# Patient Record
Sex: FEMALE | Race: WHITE | NOT HISPANIC OR LATINO
[De-identification: names, ages, dates, MRNs, and addresses within clinical notes are randomized per-mention and may not be internally consistent; named-entity substitution may affect disease eponyms.]

---

## 2017-01-23 ENCOUNTER — APPOINTMENT (OUTPATIENT)
Dept: INTERNAL MEDICINE | Facility: CLINIC | Age: 42
End: 2017-01-23

## 2017-01-23 VITALS
WEIGHT: 235 LBS | OXYGEN SATURATION: 98 % | DIASTOLIC BLOOD PRESSURE: 76 MMHG | HEART RATE: 123 BPM | SYSTOLIC BLOOD PRESSURE: 126 MMHG | BODY MASS INDEX: 37.93 KG/M2 | TEMPERATURE: 98.2 F

## 2017-02-21 ENCOUNTER — RX RENEWAL (OUTPATIENT)
Age: 42
End: 2017-02-21

## 2017-02-22 ENCOUNTER — APPOINTMENT (OUTPATIENT)
Dept: INTERNAL MEDICINE | Facility: CLINIC | Age: 42
End: 2017-02-22

## 2017-03-22 ENCOUNTER — APPOINTMENT (OUTPATIENT)
Dept: INTERNAL MEDICINE | Facility: CLINIC | Age: 42
End: 2017-03-22

## 2017-03-30 ENCOUNTER — RX RENEWAL (OUTPATIENT)
Age: 42
End: 2017-03-30

## 2017-03-31 ENCOUNTER — APPOINTMENT (OUTPATIENT)
Dept: INTERNAL MEDICINE | Facility: CLINIC | Age: 42
End: 2017-03-31

## 2017-03-31 ENCOUNTER — NON-APPOINTMENT (OUTPATIENT)
Age: 42
End: 2017-03-31

## 2017-03-31 VITALS
HEART RATE: 110 BPM | BODY MASS INDEX: 38.25 KG/M2 | TEMPERATURE: 97.9 F | DIASTOLIC BLOOD PRESSURE: 88 MMHG | OXYGEN SATURATION: 92 % | SYSTOLIC BLOOD PRESSURE: 124 MMHG | WEIGHT: 238 LBS | HEIGHT: 66 IN

## 2017-03-31 DIAGNOSIS — R07.89 OTHER CHEST PAIN: ICD-10-CM

## 2017-04-03 ENCOUNTER — APPOINTMENT (OUTPATIENT)
Dept: INTERNAL MEDICINE | Facility: CLINIC | Age: 42
End: 2017-04-03

## 2017-04-03 DIAGNOSIS — Z11.1 ENCOUNTER FOR SCREENING FOR RESPIRATORY TUBERCULOSIS: ICD-10-CM

## 2017-04-04 LAB
25(OH)D3 SERPL-MCNC: 10.7 NG/ML
ALBUMIN SERPL ELPH-MCNC: 4.2 G/DL
ALP BLD-CCNC: 132 U/L
ALT SERPL-CCNC: 53 U/L
ANION GAP SERPL CALC-SCNC: 19 MMOL/L
APPEARANCE: CLEAR
AST SERPL-CCNC: 40 U/L
BASOPHILS # BLD AUTO: 0.01 K/UL
BASOPHILS NFR BLD AUTO: 0.1 %
BILIRUB SERPL-MCNC: 0.2 MG/DL
BILIRUBIN URINE: NEGATIVE
BLOOD URINE: NEGATIVE
BUN SERPL-MCNC: 6 MG/DL
CALCIUM SERPL-MCNC: 9.4 MG/DL
CHLORIDE SERPL-SCNC: 101 MMOL/L
CHOLEST SERPL-MCNC: 192 MG/DL
CHOLEST/HDLC SERPL: 11.3 RATIO
CO2 SERPL-SCNC: 22 MMOL/L
COLOR: YELLOW
CREAT SERPL-MCNC: 0.98 MG/DL
EOSINOPHIL # BLD AUTO: 0.24 K/UL
EOSINOPHIL NFR BLD AUTO: 2.7 %
GLUCOSE QUALITATIVE U: NORMAL MG/DL
GLUCOSE SERPL-MCNC: 112 MG/DL
HAV IGG+IGM SER QL: REACTIVE
HBA1C MFR BLD HPLC: 5.7 %
HBV SURFACE AB SER QL: NONREACTIVE
HBV SURFACE AG SER QL: NONREACTIVE
HCT VFR BLD CALC: 42.9 %
HDLC SERPL-MCNC: 17 MG/DL
HGB BLD-MCNC: 13.5 G/DL
HIV1+2 AB SPEC QL IA.RAPID: NONREACTIVE
HSV 1+2 IGG SER IA-IMP: NEGATIVE
HSV 1+2 IGG SER IA-IMP: POSITIVE
HSV1 IGG SER QL: 0.2 INDEX
HSV2 IGG SER QL: 4.69 INDEX
IMM GRANULOCYTES NFR BLD AUTO: 0.5 %
KETONES URINE: NEGATIVE
LDLC SERPL CALC-MCNC: NORMAL MG/DL
LEUKOCYTE ESTERASE URINE: NEGATIVE
LYMPHOCYTES # BLD AUTO: 2.63 K/UL
LYMPHOCYTES NFR BLD AUTO: 29.9 %
MAN DIFF?: NORMAL
MCHC RBC-ENTMCNC: 27.6 PG
MCHC RBC-ENTMCNC: 31.5 GM/DL
MCV RBC AUTO: 87.6 FL
MONOCYTES # BLD AUTO: 0.57 K/UL
MONOCYTES NFR BLD AUTO: 6.5 %
NEUTROPHILS # BLD AUTO: 5.3 K/UL
NEUTROPHILS NFR BLD AUTO: 60.3 %
NITRITE URINE: NEGATIVE
PH URINE: 5.5
PLATELET # BLD AUTO: 305 K/UL
POTASSIUM SERPL-SCNC: 4 MMOL/L
PROT SERPL-MCNC: 7.2 G/DL
PROTEIN URINE: NEGATIVE MG/DL
RBC # BLD: 4.9 M/UL
RBC # FLD: 15.2 %
SODIUM SERPL-SCNC: 142 MMOL/L
SPECIFIC GRAVITY URINE: 1.01
T PALLIDUM AB SER QL IA: NEGATIVE
TRIGL SERPL-MCNC: 680 MG/DL
TSH SERPL-ACNC: 3.15 UIU/ML
UROBILINOGEN URINE: NORMAL MG/DL
WBC # FLD AUTO: 8.79 K/UL

## 2017-04-17 ENCOUNTER — RX RENEWAL (OUTPATIENT)
Age: 42
End: 2017-04-17

## 2017-05-08 ENCOUNTER — RX RENEWAL (OUTPATIENT)
Age: 42
End: 2017-05-08

## 2017-06-12 ENCOUNTER — APPOINTMENT (OUTPATIENT)
Dept: INTERNAL MEDICINE | Facility: CLINIC | Age: 42
End: 2017-06-12

## 2017-10-12 ENCOUNTER — RX RENEWAL (OUTPATIENT)
Age: 42
End: 2017-10-12

## 2017-11-01 ENCOUNTER — APPOINTMENT (OUTPATIENT)
Dept: INTERNAL MEDICINE | Facility: CLINIC | Age: 42
End: 2017-11-01

## 2018-01-03 ENCOUNTER — RX RENEWAL (OUTPATIENT)
Age: 43
End: 2018-01-03

## 2018-01-11 ENCOUNTER — EMERGENCY (EMERGENCY)
Facility: HOSPITAL | Age: 43
LOS: 0 days | Discharge: HOME | End: 2018-01-11

## 2018-01-11 DIAGNOSIS — Z87.891 PERSONAL HISTORY OF NICOTINE DEPENDENCE: ICD-10-CM

## 2018-01-11 DIAGNOSIS — R51 HEADACHE: ICD-10-CM

## 2018-01-11 DIAGNOSIS — R00.1 BRADYCARDIA, UNSPECIFIED: ICD-10-CM

## 2018-01-11 DIAGNOSIS — Z87.898 PERSONAL HISTORY OF OTHER SPECIFIED CONDITIONS: ICD-10-CM

## 2018-01-11 DIAGNOSIS — R11.2 NAUSEA WITH VOMITING, UNSPECIFIED: ICD-10-CM

## 2018-01-11 DIAGNOSIS — Z79.899 OTHER LONG TERM (CURRENT) DRUG THERAPY: ICD-10-CM

## 2018-01-11 DIAGNOSIS — Z88.8 ALLERGY STATUS TO OTHER DRUGS, MEDICAMENTS AND BIOLOGICAL SUBSTANCES STATUS: ICD-10-CM

## 2018-01-11 DIAGNOSIS — F41.9 ANXIETY DISORDER, UNSPECIFIED: ICD-10-CM

## 2018-01-11 DIAGNOSIS — F41.8 OTHER SPECIFIED ANXIETY DISORDERS: ICD-10-CM

## 2018-02-07 ENCOUNTER — APPOINTMENT (OUTPATIENT)
Dept: INTERNAL MEDICINE | Facility: CLINIC | Age: 43
End: 2018-02-07
Payer: MEDICAID

## 2018-02-07 VITALS
WEIGHT: 201 LBS | DIASTOLIC BLOOD PRESSURE: 86 MMHG | OXYGEN SATURATION: 97 % | BODY MASS INDEX: 32.3 KG/M2 | TEMPERATURE: 98 F | SYSTOLIC BLOOD PRESSURE: 119 MMHG | HEIGHT: 66 IN | HEART RATE: 100 BPM

## 2018-02-07 PROCEDURE — 99214 OFFICE O/P EST MOD 30 MIN: CPT

## 2018-02-09 ENCOUNTER — RX RENEWAL (OUTPATIENT)
Age: 43
End: 2018-02-09

## 2018-02-20 ENCOUNTER — APPOINTMENT (OUTPATIENT)
Dept: NEUROLOGY | Facility: CLINIC | Age: 43
End: 2018-02-20

## 2018-04-03 ENCOUNTER — APPOINTMENT (OUTPATIENT)
Dept: NEUROLOGY | Facility: CLINIC | Age: 43
End: 2018-04-03
Payer: MEDICAID

## 2018-04-03 VITALS
BODY MASS INDEX: 32.3 KG/M2 | HEIGHT: 66 IN | OXYGEN SATURATION: 94 % | HEART RATE: 88 BPM | DIASTOLIC BLOOD PRESSURE: 115 MMHG | WEIGHT: 201 LBS | SYSTOLIC BLOOD PRESSURE: 153 MMHG

## 2018-04-03 VITALS
OXYGEN SATURATION: 98 % | BODY MASS INDEX: 32.3 KG/M2 | DIASTOLIC BLOOD PRESSURE: 85 MMHG | HEART RATE: 91 BPM | HEIGHT: 66 IN | SYSTOLIC BLOOD PRESSURE: 121 MMHG | WEIGHT: 201 LBS

## 2018-04-03 PROCEDURE — 99205 OFFICE O/P NEW HI 60 MIN: CPT

## 2018-04-03 RX ORDER — CLONAZEPAM 1 MG/1
1 TABLET ORAL
Refills: 0 | Status: ACTIVE | COMMUNITY

## 2018-04-04 ENCOUNTER — APPOINTMENT (OUTPATIENT)
Dept: INTERNAL MEDICINE | Facility: CLINIC | Age: 43
End: 2018-04-04
Payer: MEDICAID

## 2018-04-04 VITALS
SYSTOLIC BLOOD PRESSURE: 140 MMHG | OXYGEN SATURATION: 98 % | TEMPERATURE: 97.8 F | BODY MASS INDEX: 31.82 KG/M2 | WEIGHT: 198 LBS | DIASTOLIC BLOOD PRESSURE: 88 MMHG | HEART RATE: 90 BPM | HEIGHT: 66 IN

## 2018-04-04 DIAGNOSIS — B19.20 UNSPECIFIED VIRAL HEPATITIS C W/OUT HEPATIC COMA: ICD-10-CM

## 2018-04-04 DIAGNOSIS — R94.31 ABNORMAL ELECTROCARDIOGRAM [ECG] [EKG]: ICD-10-CM

## 2018-04-04 DIAGNOSIS — R51 HEADACHE: ICD-10-CM

## 2018-04-04 DIAGNOSIS — F32.9 MAJOR DEPRESSIVE DISORDER, SINGLE EPISODE, UNSPECIFIED: ICD-10-CM

## 2018-04-04 PROCEDURE — G0447 BEHAVIOR COUNSEL OBESITY 15M: CPT

## 2018-04-04 PROCEDURE — G0444 DEPRESSION SCREEN ANNUAL: CPT | Mod: 59

## 2018-04-04 PROCEDURE — 99396 PREV VISIT EST AGE 40-64: CPT | Mod: 25

## 2018-04-04 PROCEDURE — 36415 COLL VENOUS BLD VENIPUNCTURE: CPT

## 2018-04-04 PROCEDURE — 93000 ELECTROCARDIOGRAM COMPLETE: CPT

## 2018-04-05 LAB
ALBUMIN SERPL ELPH-MCNC: 4.6 G/DL
ALP BLD-CCNC: 102 U/L
ALT SERPL-CCNC: 27 U/L
ANION GAP SERPL CALC-SCNC: 17 MMOL/L
APPEARANCE: CLEAR
AST SERPL-CCNC: 19 U/L
BASOPHILS # BLD AUTO: 0.02 K/UL
BASOPHILS NFR BLD AUTO: 0.3 %
BILIRUB SERPL-MCNC: 0.5 MG/DL
BILIRUBIN URINE: NEGATIVE
BLOOD URINE: NEGATIVE
BUN SERPL-MCNC: 8 MG/DL
CALCIUM SERPL-MCNC: 9.4 MG/DL
CHLORIDE SERPL-SCNC: 101 MMOL/L
CHOLEST SERPL-MCNC: 196 MG/DL
CHOLEST/HDLC SERPL: 4.7 RATIO
CO2 SERPL-SCNC: 23 MMOL/L
COLOR: YELLOW
CREAT SERPL-MCNC: 0.9 MG/DL
CRP SERPL-MCNC: 0.4 MG/DL
EOSINOPHIL # BLD AUTO: 0.12 K/UL
EOSINOPHIL NFR BLD AUTO: 1.7 %
ERYTHROCYTE [SEDIMENTATION RATE] IN BLOOD BY WESTERGREN METHOD: 16 MM/HR
GLUCOSE QUALITATIVE U: NEGATIVE MG/DL
GLUCOSE SERPL-MCNC: 86 MG/DL
HBA1C MFR BLD HPLC: 4.9 %
HBV SURFACE AB SER QL: NONREACTIVE
HBV SURFACE AG SER QL: NONREACTIVE
HCT VFR BLD CALC: 46 %
HDLC SERPL-MCNC: 42 MG/DL
HGB BLD-MCNC: 15.3 G/DL
IMM GRANULOCYTES NFR BLD AUTO: 0.1 %
KETONES URINE: NEGATIVE
LDLC SERPL CALC-MCNC: 112 MG/DL
LEUKOCYTE ESTERASE URINE: NEGATIVE
LYMPHOCYTES # BLD AUTO: 2.15 K/UL
LYMPHOCYTES NFR BLD AUTO: 30.2 %
MAN DIFF?: NORMAL
MCHC RBC-ENTMCNC: 29 PG
MCHC RBC-ENTMCNC: 33.3 GM/DL
MCV RBC AUTO: 87.1 FL
MONOCYTES # BLD AUTO: 0.34 K/UL
MONOCYTES NFR BLD AUTO: 4.8 %
NEUTROPHILS # BLD AUTO: 4.48 K/UL
NEUTROPHILS NFR BLD AUTO: 62.9 %
NITRITE URINE: NEGATIVE
PH URINE: 5
PLATELET # BLD AUTO: 282 K/UL
POTASSIUM SERPL-SCNC: 3.9 MMOL/L
PROT SERPL-MCNC: 7.3 G/DL
PROTEIN URINE: NEGATIVE MG/DL
RBC # BLD: 5.28 M/UL
RBC # FLD: 13.7 %
SODIUM SERPL-SCNC: 141 MMOL/L
SPECIFIC GRAVITY URINE: 1.01
TRIGL SERPL-MCNC: 209 MG/DL
TSH SERPL-ACNC: 1.48 UIU/ML
UROBILINOGEN URINE: NEGATIVE MG/DL
WBC # FLD AUTO: 7.12 K/UL

## 2018-04-06 LAB
HCV RNA SERPL NAA DL=5-ACNC: NOT DETECTED IU/ML
HCV RNA SERPL NAA+PROBE-LOG IU: NOT DETECTED LOGIU/ML

## 2018-04-13 ENCOUNTER — RX RENEWAL (OUTPATIENT)
Age: 43
End: 2018-04-13

## 2018-05-11 ENCOUNTER — APPOINTMENT (OUTPATIENT)
Dept: MRI IMAGING | Facility: HOSPITAL | Age: 43
End: 2018-05-11
Payer: MEDICAID

## 2018-05-14 ENCOUNTER — RX RENEWAL (OUTPATIENT)
Age: 43
End: 2018-05-14

## 2018-05-15 ENCOUNTER — APPOINTMENT (OUTPATIENT)
Dept: NEUROLOGY | Facility: CLINIC | Age: 43
End: 2018-05-15

## 2018-06-08 ENCOUNTER — APPOINTMENT (OUTPATIENT)
Dept: MRI IMAGING | Facility: HOSPITAL | Age: 43
End: 2018-06-08

## 2018-06-25 ENCOUNTER — FORM ENCOUNTER (OUTPATIENT)
Age: 43
End: 2018-06-25

## 2018-06-26 ENCOUNTER — APPOINTMENT (OUTPATIENT)
Dept: MRI IMAGING | Facility: HOSPITAL | Age: 43
End: 2018-06-26

## 2018-06-26 ENCOUNTER — OUTPATIENT (OUTPATIENT)
Dept: OUTPATIENT SERVICES | Facility: HOSPITAL | Age: 43
LOS: 1 days | End: 2018-06-26
Payer: COMMERCIAL

## 2018-06-26 PROCEDURE — 70551 MRI BRAIN STEM W/O DYE: CPT

## 2018-06-26 PROCEDURE — 70551 MRI BRAIN STEM W/O DYE: CPT | Mod: 26

## 2018-07-05 ENCOUNTER — RX RENEWAL (OUTPATIENT)
Age: 43
End: 2018-07-05

## 2018-07-10 ENCOUNTER — OTHER (OUTPATIENT)
Age: 43
End: 2018-07-10

## 2018-07-11 ENCOUNTER — OTHER (OUTPATIENT)
Age: 43
End: 2018-07-11

## 2018-07-17 ENCOUNTER — APPOINTMENT (OUTPATIENT)
Dept: MRI IMAGING | Facility: CLINIC | Age: 43
End: 2018-07-17

## 2018-07-27 ENCOUNTER — APPOINTMENT (OUTPATIENT)
Dept: MRI IMAGING | Facility: CLINIC | Age: 43
End: 2018-07-27

## 2018-08-01 ENCOUNTER — RX RENEWAL (OUTPATIENT)
Age: 43
End: 2018-08-01

## 2018-09-04 ENCOUNTER — RX RENEWAL (OUTPATIENT)
Age: 43
End: 2018-09-04

## 2018-10-04 ENCOUNTER — RX RENEWAL (OUTPATIENT)
Age: 43
End: 2018-10-04

## 2018-10-24 ENCOUNTER — RX RENEWAL (OUTPATIENT)
Age: 43
End: 2018-10-24

## 2019-01-24 ENCOUNTER — APPOINTMENT (OUTPATIENT)
Dept: INTERNAL MEDICINE | Facility: CLINIC | Age: 44
End: 2019-01-24
Payer: MEDICARE

## 2019-01-24 VITALS
DIASTOLIC BLOOD PRESSURE: 90 MMHG | BODY MASS INDEX: 38.09 KG/M2 | OXYGEN SATURATION: 97 % | WEIGHT: 237 LBS | HEIGHT: 66 IN | SYSTOLIC BLOOD PRESSURE: 140 MMHG | TEMPERATURE: 98.4 F | HEART RATE: 91 BPM

## 2019-01-24 DIAGNOSIS — M54.31 SCIATICA, RIGHT SIDE: ICD-10-CM

## 2019-01-24 DIAGNOSIS — Z59.9 PROBLEM RELATED TO HOUSING AND ECONOMIC CIRCUMSTANCES, UNSPECIFIED: ICD-10-CM

## 2019-01-24 PROCEDURE — 99213 OFFICE O/P EST LOW 20 MIN: CPT

## 2019-01-24 RX ORDER — AMITRIPTYLINE HYDROCHLORIDE 100 MG/1
100 TABLET, FILM COATED ORAL
Qty: 30 | Refills: 0 | Status: ACTIVE | COMMUNITY
Start: 2019-01-10

## 2019-01-24 SDOH — ECONOMIC STABILITY - INCOME SECURITY: PROBLEM RELATED TO HOUSING AND ECONOMIC CIRCUMSTANCES, UNSPECIFIED: Z59.9

## 2019-03-25 ENCOUNTER — RX RENEWAL (OUTPATIENT)
Age: 44
End: 2019-03-25

## 2019-03-28 ENCOUNTER — TRANSCRIPTION ENCOUNTER (OUTPATIENT)
Age: 44
End: 2019-03-28

## 2019-04-10 ENCOUNTER — APPOINTMENT (OUTPATIENT)
Dept: PHYSICAL MEDICINE AND REHAB | Facility: CLINIC | Age: 44
End: 2019-04-10

## 2019-04-15 ENCOUNTER — APPOINTMENT (OUTPATIENT)
Dept: PHYSICAL MEDICINE AND REHAB | Facility: CLINIC | Age: 44
End: 2019-04-15

## 2019-04-17 ENCOUNTER — RX RENEWAL (OUTPATIENT)
Age: 44
End: 2019-04-17

## 2019-05-09 ENCOUNTER — RX RENEWAL (OUTPATIENT)
Age: 44
End: 2019-05-09

## 2019-05-16 ENCOUNTER — APPOINTMENT (OUTPATIENT)
Dept: PHYSICAL MEDICINE AND REHAB | Facility: CLINIC | Age: 44
End: 2019-05-16
Payer: MEDICARE

## 2019-05-16 VITALS — HEART RATE: 82 BPM | BODY MASS INDEX: 38.57 KG/M2 | WEIGHT: 240 LBS | HEIGHT: 66 IN

## 2019-05-16 PROCEDURE — 99203 OFFICE O/P NEW LOW 30 MIN: CPT | Mod: 25

## 2019-05-16 PROCEDURE — 20610 DRAIN/INJ JOINT/BURSA W/O US: CPT | Mod: RT

## 2019-06-11 ENCOUNTER — RX RENEWAL (OUTPATIENT)
Age: 44
End: 2019-06-11

## 2019-06-12 ENCOUNTER — RX RENEWAL (OUTPATIENT)
Age: 44
End: 2019-06-12

## 2019-06-24 ENCOUNTER — APPOINTMENT (OUTPATIENT)
Dept: INTERNAL MEDICINE | Facility: CLINIC | Age: 44
End: 2019-06-24
Payer: MEDICARE

## 2019-06-24 DIAGNOSIS — F41.9 ANXIETY DISORDER, UNSPECIFIED: ICD-10-CM

## 2019-06-24 DIAGNOSIS — E66.9 OBESITY, UNSPECIFIED: ICD-10-CM

## 2019-06-24 DIAGNOSIS — M17.12 UNILATERAL PRIMARY OSTEOARTHRITIS, LEFT KNEE: ICD-10-CM

## 2019-06-24 DIAGNOSIS — M17.11 UNILATERAL PRIMARY OSTEOARTHRITIS, RIGHT KNEE: ICD-10-CM

## 2019-06-24 DIAGNOSIS — Z86.59 PERSONAL HISTORY OF OTHER MENTAL AND BEHAVIORAL DISORDERS: ICD-10-CM

## 2019-06-24 PROCEDURE — 99214 OFFICE O/P EST MOD 30 MIN: CPT

## 2019-06-24 RX ORDER — GABAPENTIN 300 MG/1
300 CAPSULE ORAL
Qty: 90 | Refills: 0 | Status: DISCONTINUED | COMMUNITY
Start: 2018-12-13 | End: 2019-06-24

## 2019-07-08 ENCOUNTER — RX RENEWAL (OUTPATIENT)
Age: 44
End: 2019-07-08

## 2019-07-30 ENCOUNTER — RX RENEWAL (OUTPATIENT)
Age: 44
End: 2019-07-30

## 2019-07-30 ENCOUNTER — APPOINTMENT (OUTPATIENT)
Dept: INTERNAL MEDICINE | Facility: CLINIC | Age: 44
End: 2019-07-30

## 2019-07-30 RX ORDER — BETAMETHASONE DIPROPIONATE 0.5 MG/G
0.05 CREAM TOPICAL
Qty: 3 | Refills: 3 | Status: ACTIVE | COMMUNITY
Start: 2019-07-30 | End: 1900-01-01

## 2019-08-07 ENCOUNTER — RX RENEWAL (OUTPATIENT)
Age: 44
End: 2019-08-07

## 2019-10-08 ENCOUNTER — RX RENEWAL (OUTPATIENT)
Age: 44
End: 2019-10-08

## 2019-10-10 ENCOUNTER — RX RENEWAL (OUTPATIENT)
Age: 44
End: 2019-10-10

## 2019-11-05 ENCOUNTER — RX RENEWAL (OUTPATIENT)
Age: 44
End: 2019-11-05

## 2019-12-12 ENCOUNTER — RX RENEWAL (OUTPATIENT)
Age: 44
End: 2019-12-12

## 2020-01-06 ENCOUNTER — RX RENEWAL (OUTPATIENT)
Age: 45
End: 2020-01-06

## 2020-01-28 ENCOUNTER — RX RENEWAL (OUTPATIENT)
Age: 45
End: 2020-01-28

## 2020-02-06 ENCOUNTER — APPOINTMENT (OUTPATIENT)
Dept: INTERNAL MEDICINE | Facility: CLINIC | Age: 45
End: 2020-02-06

## 2020-04-13 ENCOUNTER — APPOINTMENT (OUTPATIENT)
Dept: INTERNAL MEDICINE | Facility: CLINIC | Age: 45
End: 2020-04-13
Payer: MEDICARE

## 2020-04-13 DIAGNOSIS — M54.32 SCIATICA, LEFT SIDE: ICD-10-CM

## 2020-04-13 PROCEDURE — G2012 BRIEF CHECK IN BY MD/QHP: CPT

## 2020-04-13 RX ORDER — AMITRIPTYLINE HYDROCHLORIDE 150 MG/1
150 TABLET, FILM COATED ORAL
Qty: 30 | Refills: 0 | Status: ACTIVE | COMMUNITY
Start: 2020-03-18

## 2020-04-13 RX ORDER — BUPROPION HYDROCHLORIDE 100 MG/1
100 TABLET, FILM COATED, EXTENDED RELEASE ORAL
Qty: 30 | Refills: 0 | Status: ACTIVE | COMMUNITY
Start: 2019-10-31

## 2020-04-13 RX ORDER — GABAPENTIN 600 MG/1
600 TABLET, COATED ORAL
Qty: 90 | Refills: 3 | Status: ACTIVE | COMMUNITY
Start: 2020-04-13

## 2020-05-11 ENCOUNTER — APPOINTMENT (OUTPATIENT)
Dept: INTERNAL MEDICINE | Facility: CLINIC | Age: 45
End: 2020-05-11
Payer: MEDICARE

## 2020-05-11 PROCEDURE — 99214 OFFICE O/P EST MOD 30 MIN: CPT | Mod: 95

## 2020-05-14 ENCOUNTER — APPOINTMENT (OUTPATIENT)
Dept: INTERNAL MEDICINE | Facility: CLINIC | Age: 45
End: 2020-05-14
Payer: MEDICARE

## 2020-05-14 DIAGNOSIS — R68.89 OTHER GENERAL SYMPTOMS AND SIGNS: ICD-10-CM

## 2020-05-14 DIAGNOSIS — R11.2 NAUSEA WITH VOMITING, UNSPECIFIED: ICD-10-CM

## 2020-05-14 DIAGNOSIS — R10.9 UNSPECIFIED ABDOMINAL PAIN: ICD-10-CM

## 2020-05-14 PROCEDURE — 99214 OFFICE O/P EST MOD 30 MIN: CPT | Mod: 95

## 2020-06-07 ENCOUNTER — RX RENEWAL (OUTPATIENT)
Age: 45
End: 2020-06-07

## 2020-06-16 ENCOUNTER — RX RENEWAL (OUTPATIENT)
Age: 45
End: 2020-06-16

## 2020-08-24 ENCOUNTER — RX RENEWAL (OUTPATIENT)
Age: 45
End: 2020-08-24

## 2020-09-15 ENCOUNTER — EMERGENCY (EMERGENCY)
Facility: HOSPITAL | Age: 45
LOS: 0 days | Discharge: HOME | End: 2020-09-15
Attending: EMERGENCY MEDICINE | Admitting: EMERGENCY MEDICINE
Payer: MEDICARE

## 2020-09-15 VITALS
SYSTOLIC BLOOD PRESSURE: 146 MMHG | OXYGEN SATURATION: 97 % | RESPIRATION RATE: 18 BRPM | WEIGHT: 212.97 LBS | DIASTOLIC BLOOD PRESSURE: 94 MMHG | TEMPERATURE: 99 F | HEART RATE: 99 BPM

## 2020-09-15 DIAGNOSIS — Z88.8 ALLERGY STATUS TO OTHER DRUGS, MEDICAMENTS AND BIOLOGICAL SUBSTANCES: ICD-10-CM

## 2020-09-15 DIAGNOSIS — N93.9 ABNORMAL UTERINE AND VAGINAL BLEEDING, UNSPECIFIED: ICD-10-CM

## 2020-09-15 DIAGNOSIS — Z85.43 PERSONAL HISTORY OF MALIGNANT NEOPLASM OF OVARY: ICD-10-CM

## 2020-09-15 DIAGNOSIS — Z98.890 OTHER SPECIFIED POSTPROCEDURAL STATES: ICD-10-CM

## 2020-09-15 LAB
ALBUMIN SERPL ELPH-MCNC: 3.9 G/DL — SIGNIFICANT CHANGE UP (ref 3.5–5.2)
ALP SERPL-CCNC: 86 U/L — SIGNIFICANT CHANGE UP (ref 30–115)
ALT FLD-CCNC: 28 U/L — SIGNIFICANT CHANGE UP (ref 0–41)
ANION GAP SERPL CALC-SCNC: 13 MMOL/L — SIGNIFICANT CHANGE UP (ref 7–14)
APPEARANCE UR: ABNORMAL
AST SERPL-CCNC: 30 U/L — SIGNIFICANT CHANGE UP (ref 0–41)
BACTERIA # UR AUTO: NEGATIVE — SIGNIFICANT CHANGE UP
BASOPHILS # BLD AUTO: 0.02 K/UL — SIGNIFICANT CHANGE UP (ref 0–0.2)
BASOPHILS NFR BLD AUTO: 0.2 % — SIGNIFICANT CHANGE UP (ref 0–1)
BILIRUB SERPL-MCNC: <0.2 MG/DL — SIGNIFICANT CHANGE UP (ref 0.2–1.2)
BILIRUB UR-MCNC: NEGATIVE — SIGNIFICANT CHANGE UP
BUN SERPL-MCNC: 6 MG/DL — LOW (ref 10–20)
CALCIUM SERPL-MCNC: 8.9 MG/DL — SIGNIFICANT CHANGE UP (ref 8.5–10.1)
CHLORIDE SERPL-SCNC: 104 MMOL/L — SIGNIFICANT CHANGE UP (ref 98–110)
CO2 SERPL-SCNC: 22 MMOL/L — SIGNIFICANT CHANGE UP (ref 17–32)
COLOR SPEC: YELLOW — SIGNIFICANT CHANGE UP
CREAT SERPL-MCNC: 0.9 MG/DL — SIGNIFICANT CHANGE UP (ref 0.7–1.5)
DIFF PNL FLD: ABNORMAL
EOSINOPHIL # BLD AUTO: 0.07 K/UL — SIGNIFICANT CHANGE UP (ref 0–0.7)
EOSINOPHIL NFR BLD AUTO: 0.8 % — SIGNIFICANT CHANGE UP (ref 0–8)
EPI CELLS # UR: 1 /HPF — SIGNIFICANT CHANGE UP (ref 0–5)
GLUCOSE SERPL-MCNC: 105 MG/DL — HIGH (ref 70–99)
GLUCOSE UR QL: NEGATIVE — SIGNIFICANT CHANGE UP
HCG SERPL QL: NEGATIVE — SIGNIFICANT CHANGE UP
HCT VFR BLD CALC: 35.8 % — LOW (ref 37–47)
HGB BLD-MCNC: 12.3 G/DL — SIGNIFICANT CHANGE UP (ref 12–16)
HYALINE CASTS # UR AUTO: 0 /LPF — SIGNIFICANT CHANGE UP (ref 0–7)
IMM GRANULOCYTES NFR BLD AUTO: 0.3 % — SIGNIFICANT CHANGE UP (ref 0.1–0.3)
KETONES UR-MCNC: NEGATIVE — SIGNIFICANT CHANGE UP
LEUKOCYTE ESTERASE UR-ACNC: NEGATIVE — SIGNIFICANT CHANGE UP
LYMPHOCYTES # BLD AUTO: 2.41 K/UL — SIGNIFICANT CHANGE UP (ref 1.2–3.4)
LYMPHOCYTES # BLD AUTO: 25.8 % — SIGNIFICANT CHANGE UP (ref 20.5–51.1)
MCHC RBC-ENTMCNC: 28.4 PG — SIGNIFICANT CHANGE UP (ref 27–31)
MCHC RBC-ENTMCNC: 34.4 G/DL — SIGNIFICANT CHANGE UP (ref 32–37)
MCV RBC AUTO: 82.7 FL — SIGNIFICANT CHANGE UP (ref 81–99)
MONOCYTES # BLD AUTO: 0.47 K/UL — SIGNIFICANT CHANGE UP (ref 0.1–0.6)
MONOCYTES NFR BLD AUTO: 5 % — SIGNIFICANT CHANGE UP (ref 1.7–9.3)
NEUTROPHILS # BLD AUTO: 6.33 K/UL — SIGNIFICANT CHANGE UP (ref 1.4–6.5)
NEUTROPHILS NFR BLD AUTO: 67.9 % — SIGNIFICANT CHANGE UP (ref 42.2–75.2)
NITRITE UR-MCNC: NEGATIVE — SIGNIFICANT CHANGE UP
NRBC # BLD: 0 /100 WBCS — SIGNIFICANT CHANGE UP (ref 0–0)
PH UR: 6 — SIGNIFICANT CHANGE UP (ref 5–8)
PLATELET # BLD AUTO: 233 K/UL — SIGNIFICANT CHANGE UP (ref 130–400)
POTASSIUM SERPL-MCNC: 4.6 MMOL/L — SIGNIFICANT CHANGE UP (ref 3.5–5)
POTASSIUM SERPL-SCNC: 4.6 MMOL/L — SIGNIFICANT CHANGE UP (ref 3.5–5)
PROT SERPL-MCNC: 6.3 G/DL — SIGNIFICANT CHANGE UP (ref 6–8)
PROT UR-MCNC: SIGNIFICANT CHANGE UP
RBC # BLD: 4.33 M/UL — SIGNIFICANT CHANGE UP (ref 4.2–5.4)
RBC # FLD: 12.9 % — SIGNIFICANT CHANGE UP (ref 11.5–14.5)
RBC CASTS # UR COMP ASSIST: 607 /HPF — HIGH (ref 0–4)
SODIUM SERPL-SCNC: 139 MMOL/L — SIGNIFICANT CHANGE UP (ref 135–146)
SP GR SPEC: 1.02 — SIGNIFICANT CHANGE UP (ref 1.01–1.03)
UROBILINOGEN FLD QL: SIGNIFICANT CHANGE UP
WBC # BLD: 9.33 K/UL — SIGNIFICANT CHANGE UP (ref 4.8–10.8)
WBC # FLD AUTO: 9.33 K/UL — SIGNIFICANT CHANGE UP (ref 4.8–10.8)
WBC UR QL: 2 /HPF — SIGNIFICANT CHANGE UP (ref 0–5)

## 2020-09-15 PROCEDURE — 99284 EMERGENCY DEPT VISIT MOD MDM: CPT

## 2020-09-15 PROCEDURE — 76830 TRANSVAGINAL US NON-OB: CPT | Mod: 26

## 2020-09-15 RX ORDER — SODIUM CHLORIDE 9 MG/ML
1000 INJECTION INTRAMUSCULAR; INTRAVENOUS; SUBCUTANEOUS ONCE
Refills: 0 | Status: COMPLETED | OUTPATIENT
Start: 2020-09-15 | End: 2020-09-15

## 2020-09-15 RX ORDER — KETOROLAC TROMETHAMINE 30 MG/ML
15 SYRINGE (ML) INJECTION ONCE
Refills: 0 | Status: DISCONTINUED | OUTPATIENT
Start: 2020-09-15 | End: 2020-09-15

## 2020-09-15 RX ADMIN — Medication 15 MILLIGRAM(S): at 21:47

## 2020-09-15 RX ADMIN — SODIUM CHLORIDE 1000 MILLILITER(S): 9 INJECTION INTRAMUSCULAR; INTRAVENOUS; SUBCUTANEOUS at 21:47

## 2020-09-15 NOTE — ED PROVIDER NOTE - CLINICAL SUMMARY MEDICAL DECISION MAKING FREE TEXT BOX
44 y/o female in ER with c/o heavy VB, LMP ~ 3-4 yrs ago, but had episode of VB 3 months ago.  VS OK, no active bleeding on exam.  Hgb 12.3.  Pelvic sono - normal endometrium.  Results d/w pt, told she needs a gyn eval for abnormal VB, may need endometrial biopsy.  Pt already has appt with her own gyn in 2 days. Pt given copy of labs and imaging reports, pt told to return to ER for increased bleeding, dizziness, syncope/near syncope, or any other new/concerning symptoms.  pt understands and agrees with plan.

## 2020-09-15 NOTE — ED PROVIDER NOTE - OBJECTIVE STATEMENT
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 claims she was using 30 pads a day. bleeding associates with pelvic cramping so she comes to ED for evaluation. states she hasn't had menses for 3-4 years but did have another episode of vaginal bleeding (7-8 days long) 3 months ago. denies  dysuria/urgency/frequency. denies flank pain/ vaginal discharge/lesion Denies fever/chill/HA/dizziness/chest pain/palpitation/sob/abd pain/n/v/d/ black stool/bloody stool.

## 2020-09-15 NOTE — ED PROVIDER NOTE - ATTENDING CONTRIBUTION TO CARE
46 y/o female s/p L salpingo-oophorectomy '14, with h/o fibroids, endometriosis, in ER with c/o heavy VB of the past 2 days.  + pelvic cramping.  no n/v/d.  no f/c.  no urinary symptoms.  no cp/sob.  no dizziness, syncope/near syncope.  Pt states last menses was ~ 3-4 yrs ago, but she did have episode of VB 3 months ago.  She has appt with her gyn in 2 days.   PE - nad, nc/at, eomi, perrl, op - clear, mmm, cta b/l, no w/r/r, rrr, abd - soft, nt/nd, nabs, pelvic - as in PA note, no active bleeding, small amount of dried blood in vagina, from x 4, A&O x 3, no focal neuro deficits.  -ivf, check labs, pelvic sono.

## 2020-09-15 NOTE — ED PROVIDER NOTE - GENITOURINARY VAGINAL
small amount of dried blood. no active bleeding/BLOOD IN VAGINAL VAULT BLOOD IN VAGINAL VAULT/small amount of dried blood. no active bleeding. no clots

## 2020-09-15 NOTE — ED ADULT TRIAGE NOTE - MEANS OF ARRIVAL
Our office will contact you once your neurology referral is ready  Have lab work performed today  
ambulatory

## 2020-09-15 NOTE — ED PROVIDER NOTE - NS ED ROS FT
Constitutional: no fever, chills, no recent weight loss, change in appetite or malaise  Eyes: no redness/discharge/pain/vision changes  ENT: no rhinorrhea/ear pain/sore throat  Cardiac: No chest pain, SOB or edema.  Respiratory: No cough or respiratory distress  GI: No nausea, vomiting, diarrhea or abdominal pain.  : see HPI  MS: no pain to back or extremities, no loss of ROM, no weakness  Neuro: No headache or weakness. No LOC.  Skin: No skin rash.  Endocrine: No history of thyroid disease or diabetes.

## 2020-09-15 NOTE — ED PROVIDER NOTE - PATIENT PORTAL LINK FT
You can access the FollowMyHealth Patient Portal offered by [facility] by registering at the following website: [email or web address]. By joining [email or web address]’s FollowMyHealth portal, you will also be able to view your health information using other applications (apps) compatible with our system.

## 2020-09-16 DIAGNOSIS — Z90.79 ACQUIRED ABSENCE OF OTHER GENITAL ORGAN(S): Chronic | ICD-10-CM

## 2020-09-17 LAB
CULTURE RESULTS: SIGNIFICANT CHANGE UP
SPECIMEN SOURCE: SIGNIFICANT CHANGE UP

## 2020-09-22 ENCOUNTER — RX RENEWAL (OUTPATIENT)
Age: 45
End: 2020-09-22

## 2020-10-20 ENCOUNTER — RX RENEWAL (OUTPATIENT)
Age: 45
End: 2020-10-20

## 2020-10-29 ENCOUNTER — RX RENEWAL (OUTPATIENT)
Age: 45
End: 2020-10-29

## 2020-12-03 ENCOUNTER — RX RENEWAL (OUTPATIENT)
Age: 45
End: 2020-12-03

## 2021-02-17 ENCOUNTER — RX RENEWAL (OUTPATIENT)
Age: 46
End: 2021-02-17

## 2021-03-01 ENCOUNTER — TRANSCRIPTION ENCOUNTER (OUTPATIENT)
Age: 46
End: 2021-03-01

## 2021-03-01 PROBLEM — C56.2 MALIGNANT NEOPLASM OF LEFT OVARY: Chronic | Status: ACTIVE | Noted: 2020-09-16

## 2021-03-12 ENCOUNTER — APPOINTMENT (OUTPATIENT)
Dept: INTERNAL MEDICINE | Facility: CLINIC | Age: 46
End: 2021-03-12

## 2021-03-12 DIAGNOSIS — Z12.39 ENCOUNTER FOR OTHER SCREENING FOR MALIGNANT NEOPLASM OF BREAST: ICD-10-CM

## 2021-03-12 DIAGNOSIS — Z13.1 ENCOUNTER FOR SCREENING FOR DIABETES MELLITUS: ICD-10-CM

## 2021-03-12 DIAGNOSIS — Z13.220 ENCOUNTER FOR SCREENING FOR LIPOID DISORDERS: ICD-10-CM

## 2021-03-12 DIAGNOSIS — M19.90 UNSPECIFIED OSTEOARTHRITIS, UNSPECIFIED SITE: ICD-10-CM

## 2021-03-12 DIAGNOSIS — Z00.00 ENCOUNTER FOR GENERAL ADULT MEDICAL EXAMINATION W/OUT ABNORMAL FINDINGS: ICD-10-CM

## 2021-04-12 ENCOUNTER — RX RENEWAL (OUTPATIENT)
Age: 46
End: 2021-04-12

## 2021-04-28 RX ORDER — PROCHLORPERAZINE MALEATE 5 MG/1
5 TABLET ORAL
Qty: 45 | Refills: 0 | Status: ACTIVE | COMMUNITY
Start: 2020-05-14 | End: 1900-01-01

## 2021-06-04 ENCOUNTER — RX RENEWAL (OUTPATIENT)
Age: 46
End: 2021-06-04

## 2021-11-28 ENCOUNTER — RX RENEWAL (OUTPATIENT)
Age: 46
End: 2021-11-28

## 2021-11-29 ENCOUNTER — RX RENEWAL (OUTPATIENT)
Age: 46
End: 2021-11-29

## 2021-12-21 ENCOUNTER — RX RENEWAL (OUTPATIENT)
Age: 46
End: 2021-12-21

## 2021-12-21 RX ORDER — DICLOFENAC SODIUM 50 MG/1
50 TABLET, DELAYED RELEASE ORAL
Qty: 45 | Refills: 0 | Status: ACTIVE | COMMUNITY
Start: 2019-11-05 | End: 1900-01-01

## 2021-12-28 ENCOUNTER — RX RENEWAL (OUTPATIENT)
Age: 46
End: 2021-12-28